# Patient Record
Sex: MALE | Race: WHITE | NOT HISPANIC OR LATINO | Employment: FULL TIME | URBAN - METROPOLITAN AREA
[De-identification: names, ages, dates, MRNs, and addresses within clinical notes are randomized per-mention and may not be internally consistent; named-entity substitution may affect disease eponyms.]

---

## 2024-08-26 ENCOUNTER — APPOINTMENT (OUTPATIENT)
Dept: PRIMARY CARE | Facility: CLINIC | Age: 29
End: 2024-08-26

## 2024-10-23 ENCOUNTER — APPOINTMENT (OUTPATIENT)
Dept: DERMATOLOGY | Facility: CLINIC | Age: 29
End: 2024-10-23
Payer: COMMERCIAL

## 2024-10-23 DIAGNOSIS — L60.3 NAIL DYSTROPHY: Primary | ICD-10-CM

## 2024-10-23 DIAGNOSIS — L72.0 MILIA: ICD-10-CM

## 2024-10-23 DIAGNOSIS — B07.9 VERRUCA VULGARIS: ICD-10-CM

## 2024-10-23 DIAGNOSIS — L91.8 SKIN TAG: ICD-10-CM

## 2024-10-23 PROCEDURE — 17110 DESTRUCTION B9 LES UP TO 14: CPT | Performed by: DERMATOLOGY

## 2024-10-23 PROCEDURE — 87102 FUNGUS ISOLATION CULTURE: CPT | Performed by: DERMATOLOGY

## 2024-10-23 PROCEDURE — 99203 OFFICE O/P NEW LOW 30 MIN: CPT | Performed by: DERMATOLOGY

## 2024-10-23 RX ORDER — ALBUTEROL SULFATE 90 UG/1
2 INHALANT RESPIRATORY (INHALATION) EVERY 4 HOURS PRN
COMMUNITY
Start: 2024-06-25

## 2024-11-12 LAB
CALCOFLUOR WHITE SPEC: NORMAL
FUNGUS SPEC CULT: NORMAL

## 2024-11-13 ENCOUNTER — APPOINTMENT (OUTPATIENT)
Dept: DERMATOLOGY | Facility: CLINIC | Age: 29
End: 2024-11-13
Payer: COMMERCIAL

## 2024-11-13 DIAGNOSIS — L60.3 NAIL DYSTROPHY: Primary | ICD-10-CM

## 2024-11-13 DIAGNOSIS — L81.4 LENTIGO: ICD-10-CM

## 2024-11-13 DIAGNOSIS — B07.9 VERRUCA VULGARIS: ICD-10-CM

## 2024-11-13 DIAGNOSIS — L82.1 SEBORRHEIC KERATOSIS: ICD-10-CM

## 2024-11-13 DIAGNOSIS — Z12.83 SCREENING EXAM FOR SKIN CANCER: ICD-10-CM

## 2024-11-13 DIAGNOSIS — D18.01 HEMANGIOMA OF SKIN: ICD-10-CM

## 2024-11-13 DIAGNOSIS — D22.9 MULTIPLE BENIGN NEVI: ICD-10-CM

## 2024-11-13 PROCEDURE — 99213 OFFICE O/P EST LOW 20 MIN: CPT | Performed by: DERMATOLOGY

## 2024-11-13 PROCEDURE — 17110 DESTRUCTION B9 LES UP TO 14: CPT | Performed by: DERMATOLOGY

## 2024-11-13 NOTE — PROGRESS NOTES
Subjective     Abdoul Nicolas is a 29 y.o. male who presents for the following: Skin Check (Pt denies personal history of skin cancer.  Chaperone offered and declined.  ) and Wart (Left knee- history ln2 last visit. Pt states has been putting tea tree oil on the area. ).     Review of Systems:  No other skin or systemic complaints other than what is documented elsewhere in the note.    The following portions of the chart were reviewed this encounter and updated as appropriate:  Allergies  Meds  Problems  Med Hx  Surg Hx  Fam Hx                Objective     Well appearing patient in no apparent distress; mood and affect are within normal limits.    A full examination was performed including scalp, face, neck, chest, abdomen, back, bilateral upper extremities, bilateral lower extremities. Patient declines exam underneath the underwear; patient declines exam of the lower abdomen/mons pubis, buttocks, groin, genitalia, perineal and perianal skin and these areas were not examined.      All findings within normal limits unless otherwise noted below.    Assessment/Plan   1. Nail dystrophy  Left Hallux Toenail  Dystrophic nail plate with subungual debris and distal subungual hematoma, distal onycholysis    -Discussed nature of condition  -Reviewed common causes and treatment options  -Nail clipping calcofluor and culture is negative for fungus  -Advised to keep nail cut short and straight across  -Recommend DermaNail by [facility] twice daily. Apply to the affected nail plates, and on to the distal finger tip as well as the nail folds at the sides of the nail.  -Recommend use of Cutemol to affected nail plates and cuticles daily.  -DermaNail and Cutemol are available on "[employer] or from the [facility] website at: https://www.[employer]/     2. Verruca vulgaris (3)  Left Knee - Anterior (3)  Verrucous paules    Improving  -Recommend treatment with cryotherapy today    -After clinical evaluation including history  and examination today, a decision was made to perform destruction of the lesion(s).  -Patient was warned of possible side effects of liquid nitrogen treatment including formation of blisters, crusting, tenderness, scar, and discoloration which may be permanent.   -Post treatment care discussed.  -Patient advised to return the office for re-evaluation if lesion(s) do not resolve within 4 weeks.   -Patient verbalizes understanding.    Cryosurgery: After risks of blistering, pain and scar were reviewed with the patient consent was obtained. A total of 3 lesion(s) were treated.  Liquid nitrogen was the cryotherapy agent.   Patient tolerated the procedure well.  Wound care instructions were reviewed with the patient.          3. Multiple benign nevi  Brown and tan macules and papules with reassuring findings on dermoscopy    -These lesions have benign, reassuring patterns on dermoscopy  -Recommend continued self observation, and to contact the office if any changes in nevi are noticed    4. Lentigo  Tan macules    -Benign appearing on exam  -Reassurance, recommend observation    5. Seborrheic keratosis  Stuck on, waxy macule(s)/papule(s)/plaque(s) with comedo-like openings and milia like cysts    -Discussed the nature of the diagnosis  -Reassurance, recommend continued observation    6. Hemangioma of skin  Cherry red papules    -Discussed the nature of the diagnosis  -Reassurance, recommend continued observation    7. Screening exam for skin cancer        Discussed/information given on safe sun practices and use of sunscreen, sun protective clothing or sun avoidance. Recommend to use over the counter medication of sunscreen with a SPF 30 or higher on a daily basis prior to sun exposure to reduce the risk of skin cancer.    Follow up in 1 year for FSE, sooner if warts recur  Discussed if there are any changes or development of concerning symptoms (lesion/skin condition is changing, bleeding, enlarging, or worsening) the  patient is to contact my office. The patient verbalizes understanding.     Emily Gallo MD  11/13/2024

## 2024-11-13 NOTE — RESULT ENCOUNTER NOTE
Pt was contacted and detailed message was left regarding fungal report and next steps.  Call back number left at this time.     Milton Gale LPN

## 2024-11-26 LAB
CALCOFLUOR WHITE SPEC: NORMAL
FUNGUS SPEC CULT: NORMAL